# Patient Record
Sex: MALE | Race: WHITE | NOT HISPANIC OR LATINO | URBAN - METROPOLITAN AREA
[De-identification: names, ages, dates, MRNs, and addresses within clinical notes are randomized per-mention and may not be internally consistent; named-entity substitution may affect disease eponyms.]

---

## 2017-03-27 ENCOUNTER — OUTPATIENT (OUTPATIENT)
Dept: OUTPATIENT SERVICES | Facility: HOSPITAL | Age: 72
LOS: 1 days | End: 2017-03-27
Payer: MEDICARE

## 2017-03-27 ENCOUNTER — APPOINTMENT (OUTPATIENT)
Dept: UROLOGY | Facility: CLINIC | Age: 72
End: 2017-03-27

## 2017-03-27 VITALS
WEIGHT: 230 LBS | SYSTOLIC BLOOD PRESSURE: 119 MMHG | HEART RATE: 80 BPM | DIASTOLIC BLOOD PRESSURE: 75 MMHG | TEMPERATURE: 98.5 F | RESPIRATION RATE: 17 BRPM | BODY MASS INDEX: 34.07 KG/M2 | HEIGHT: 69 IN

## 2017-03-27 DIAGNOSIS — R35.0 FREQUENCY OF MICTURITION: ICD-10-CM

## 2017-03-27 PROCEDURE — 51705 CHANGE OF BLADDER TUBE: CPT

## 2017-03-27 PROCEDURE — C2627: CPT

## 2017-03-27 PROCEDURE — 76775 US EXAM ABDO BACK WALL LIM: CPT

## 2017-03-27 RX ORDER — TAMSULOSIN HYDROCHLORIDE 0.4 MG/1
0.4 CAPSULE ORAL
Qty: 90 | Refills: 3 | Status: ACTIVE | COMMUNITY
Start: 2017-03-27 | End: 1900-01-01

## 2017-03-30 LAB — PSA SERPL-MCNC: 4.03 NG/ML

## 2017-04-03 DIAGNOSIS — R33.9 RETENTION OF URINE, UNSPECIFIED: ICD-10-CM

## 2017-07-10 LAB — PSA SERPL-MCNC: 3 NG/ML

## 2017-09-25 ENCOUNTER — APPOINTMENT (OUTPATIENT)
Age: 72
End: 2017-09-25

## 2017-10-06 ENCOUNTER — APPOINTMENT (OUTPATIENT)
Dept: UROLOGY | Facility: CLINIC | Age: 72
End: 2017-10-06
Payer: MEDICARE

## 2017-10-06 ENCOUNTER — OUTPATIENT (OUTPATIENT)
Dept: OUTPATIENT SERVICES | Facility: HOSPITAL | Age: 72
LOS: 1 days | End: 2017-10-06
Payer: MEDICARE

## 2017-10-06 VITALS
TEMPERATURE: 98.2 F | BODY MASS INDEX: 34.51 KG/M2 | WEIGHT: 233 LBS | HEART RATE: 60 BPM | SYSTOLIC BLOOD PRESSURE: 136 MMHG | DIASTOLIC BLOOD PRESSURE: 78 MMHG | HEIGHT: 69 IN | RESPIRATION RATE: 17 BRPM

## 2017-10-06 DIAGNOSIS — R35.0 FREQUENCY OF MICTURITION: ICD-10-CM

## 2017-10-06 PROCEDURE — 76775 US EXAM ABDO BACK WALL LIM: CPT

## 2017-10-06 PROCEDURE — 76775 US EXAM ABDO BACK WALL LIM: CPT | Mod: 26

## 2017-10-06 PROCEDURE — 99213 OFFICE O/P EST LOW 20 MIN: CPT | Mod: 25

## 2017-10-08 ENCOUNTER — TRANSCRIPTION ENCOUNTER (OUTPATIENT)
Age: 72
End: 2017-10-08

## 2017-10-17 DIAGNOSIS — Z87.442 PERSONAL HISTORY OF URINARY CALCULI: ICD-10-CM

## 2017-10-27 ENCOUNTER — APPOINTMENT (OUTPATIENT)
Dept: UROLOGY | Facility: CLINIC | Age: 72
End: 2017-10-27
Payer: MEDICARE

## 2017-10-27 DIAGNOSIS — R97.20 ELEVATED PROSTATE, SPECIFIC ANTIGEN [PSA]: ICD-10-CM

## 2017-10-27 PROCEDURE — 99213 OFFICE O/P EST LOW 20 MIN: CPT

## 2017-10-31 LAB
APPEARANCE: CLEAR
BACTERIA UR CULT: NORMAL
BACTERIA: NEGATIVE
BILIRUBIN URINE: NEGATIVE
BLOOD URINE: NEGATIVE
COLOR: YELLOW
GLUCOSE QUALITATIVE U: NEGATIVE MG/DL
HYALINE CASTS: 2 /LPF
KETONES URINE: NEGATIVE
LEUKOCYTE ESTERASE URINE: NEGATIVE
MICROSCOPIC-UA: NORMAL
NITRITE URINE: NEGATIVE
PH URINE: 5
PROTEIN URINE: NEGATIVE MG/DL
PSA SERPL-MCNC: 3.12 NG/ML
RED BLOOD CELLS URINE: 2 /HPF
SPECIFIC GRAVITY URINE: 1.02
SQUAMOUS EPITHELIAL CELLS: 0 /HPF
UROBILINOGEN URINE: NEGATIVE MG/DL
WHITE BLOOD CELLS URINE: 0 /HPF

## 2017-11-03 ENCOUNTER — APPOINTMENT (OUTPATIENT)
Dept: UROLOGY | Facility: CLINIC | Age: 72
End: 2017-11-03
Payer: MEDICARE

## 2017-11-03 PROCEDURE — 99212 OFFICE O/P EST SF 10 MIN: CPT

## 2018-01-04 ENCOUNTER — RX RENEWAL (OUTPATIENT)
Age: 73
End: 2018-01-04

## 2018-04-09 ENCOUNTER — APPOINTMENT (OUTPATIENT)
Dept: UROLOGY | Facility: CLINIC | Age: 73
End: 2018-04-09
Payer: MEDICARE

## 2018-04-09 ENCOUNTER — OUTPATIENT (OUTPATIENT)
Dept: OUTPATIENT SERVICES | Facility: HOSPITAL | Age: 73
LOS: 1 days | End: 2018-04-09
Payer: MEDICARE

## 2018-04-09 VITALS
HEART RATE: 71 BPM | TEMPERATURE: 97.6 F | DIASTOLIC BLOOD PRESSURE: 73 MMHG | WEIGHT: 233 LBS | BODY MASS INDEX: 34.51 KG/M2 | RESPIRATION RATE: 17 BRPM | HEIGHT: 69 IN | SYSTOLIC BLOOD PRESSURE: 153 MMHG

## 2018-04-09 DIAGNOSIS — R35.0 FREQUENCY OF MICTURITION: ICD-10-CM

## 2018-04-09 PROCEDURE — 99213 OFFICE O/P EST LOW 20 MIN: CPT | Mod: 25

## 2018-04-09 PROCEDURE — 76775 US EXAM ABDO BACK WALL LIM: CPT | Mod: 26

## 2018-04-09 PROCEDURE — 76775 US EXAM ABDO BACK WALL LIM: CPT

## 2018-04-10 DIAGNOSIS — N20.0 CALCULUS OF KIDNEY: ICD-10-CM

## 2018-05-23 ENCOUNTER — RX RENEWAL (OUTPATIENT)
Age: 73
End: 2018-05-23

## 2018-10-03 ENCOUNTER — RX RENEWAL (OUTPATIENT)
Age: 73
End: 2018-10-03

## 2018-10-03 RX ORDER — ALLOPURINOL 300 MG/1
300 TABLET ORAL DAILY
Qty: 90 | Refills: 0 | Status: ACTIVE | COMMUNITY
Start: 2017-03-27 | End: 1900-01-01

## 2018-11-12 ENCOUNTER — APPOINTMENT (OUTPATIENT)
Dept: UROLOGY | Facility: CLINIC | Age: 73
End: 2018-11-12
Payer: MEDICARE

## 2018-11-12 ENCOUNTER — OUTPATIENT (OUTPATIENT)
Dept: OUTPATIENT SERVICES | Facility: HOSPITAL | Age: 73
LOS: 1 days | End: 2018-11-12
Payer: MEDICARE

## 2018-11-12 DIAGNOSIS — R35.0 FREQUENCY OF MICTURITION: ICD-10-CM

## 2018-11-12 PROCEDURE — 99213 OFFICE O/P EST LOW 20 MIN: CPT | Mod: 25

## 2018-11-12 PROCEDURE — 76775 US EXAM ABDO BACK WALL LIM: CPT | Mod: 26

## 2018-11-12 PROCEDURE — 76775 US EXAM ABDO BACK WALL LIM: CPT

## 2018-11-13 LAB — PSA SERPL-MCNC: 2.85 NG/ML

## 2018-11-14 DIAGNOSIS — N20.0 CALCULUS OF KIDNEY: ICD-10-CM

## 2018-12-24 ENCOUNTER — RX RENEWAL (OUTPATIENT)
Age: 73
End: 2018-12-24

## 2019-03-27 ENCOUNTER — RX RENEWAL (OUTPATIENT)
Age: 74
End: 2019-03-27

## 2019-05-16 ENCOUNTER — APPOINTMENT (OUTPATIENT)
Dept: UROLOGY | Facility: CLINIC | Age: 74
End: 2019-05-16
Payer: MEDICARE

## 2019-05-16 ENCOUNTER — OUTPATIENT (OUTPATIENT)
Dept: OUTPATIENT SERVICES | Facility: HOSPITAL | Age: 74
LOS: 1 days | End: 2019-05-16
Payer: MEDICARE

## 2019-05-16 DIAGNOSIS — N20.0 CALCULUS OF KIDNEY: ICD-10-CM

## 2019-05-16 DIAGNOSIS — R35.0 FREQUENCY OF MICTURITION: ICD-10-CM

## 2019-05-16 DIAGNOSIS — N13.8 BENIGN PROSTATIC HYPERPLASIA WITH LOWER URINARY TRACT SYMPMS: ICD-10-CM

## 2019-05-16 DIAGNOSIS — N40.1 BENIGN PROSTATIC HYPERPLASIA WITH LOWER URINARY TRACT SYMPMS: ICD-10-CM

## 2019-05-16 PROCEDURE — 99213 OFFICE O/P EST LOW 20 MIN: CPT | Mod: 25

## 2019-05-16 PROCEDURE — 76775 US EXAM ABDO BACK WALL LIM: CPT | Mod: 26

## 2019-05-16 PROCEDURE — 76775 US EXAM ABDO BACK WALL LIM: CPT

## 2019-05-16 NOTE — ASSESSMENT
[FreeTextEntry1] : Continue reducing Na intake. \par Recent PSA of 3.2 from April 2019, previously PSA is 2.85 from Nov 2018.

## 2019-05-16 NOTE — ADDENDUM
[FreeTextEntry1] :  I, Amber Hill, acted solely as a scribe for Dr. Maxi Lockhart. The documentation recorded by the scribe accurately reflects the service I personally performed and the decision by me.\par

## 2019-05-16 NOTE — HISTORY OF PRESENT ILLNESS
[FreeTextEntry1] : 73 year old male presents for evaluation of test results.\par He is moving to [place], [place].\par Hx of TURP 3-4 years ago and stream is adequate. \par US renal today showed bilateral non obstructing stones 4.9 mm mid right and 4.8 mm upper pole left. He also has a lower pole right renal cyst measuring 4.7cm of no significance.\par LL from 04/09/19 shows improved output and he is no longer SS CaP. His Na levels are still elevated, but overall, his results have improved.\par Continue reducing Na intake. \par Recent PSA of 3.2 from April 2019, previously PSA is 2.85 from Nov 2018.

## 2019-05-22 DIAGNOSIS — N40.1 BENIGN PROSTATIC HYPERPLASIA WITH LOWER URINARY TRACT SYMPTOMS: ICD-10-CM

## 2019-05-22 DIAGNOSIS — N20.0 CALCULUS OF KIDNEY: ICD-10-CM

## 2019-06-18 ENCOUNTER — RX RENEWAL (OUTPATIENT)
Age: 74
End: 2019-06-18

## 2019-09-16 ENCOUNTER — RX RENEWAL (OUTPATIENT)
Age: 74
End: 2019-09-16

## 2019-10-15 ENCOUNTER — RX RENEWAL (OUTPATIENT)
Age: 74
End: 2019-10-15

## 2019-10-15 RX ORDER — POTASSIUM CITRATE 10 MEQ/1
10 MEQ TABLET, EXTENDED RELEASE ORAL TWICE DAILY
Qty: 360 | Refills: 0 | Status: ACTIVE | COMMUNITY
Start: 2017-03-27 | End: 1900-01-01

## 2019-11-15 ENCOUNTER — APPOINTMENT (OUTPATIENT)
Dept: UROLOGY | Facility: CLINIC | Age: 74
End: 2019-11-15

## 2019-12-19 ENCOUNTER — RX RENEWAL (OUTPATIENT)
Age: 74
End: 2019-12-19